# Patient Record
Sex: MALE | Race: OTHER | HISPANIC OR LATINO | Employment: FULL TIME | URBAN - METROPOLITAN AREA
[De-identification: names, ages, dates, MRNs, and addresses within clinical notes are randomized per-mention and may not be internally consistent; named-entity substitution may affect disease eponyms.]

---

## 2021-06-18 ENCOUNTER — OFFICE VISIT (OUTPATIENT)
Dept: FAMILY MEDICINE | Facility: CLINIC | Age: 26
End: 2021-06-18
Payer: COMMERCIAL

## 2021-06-18 VITALS
OXYGEN SATURATION: 98 % | HEIGHT: 67 IN | BODY MASS INDEX: 41.35 KG/M2 | TEMPERATURE: 98 F | HEART RATE: 82 BPM | DIASTOLIC BLOOD PRESSURE: 74 MMHG | SYSTOLIC BLOOD PRESSURE: 112 MMHG | WEIGHT: 263.44 LBS

## 2021-06-18 DIAGNOSIS — Z00.00 ROUTINE HEALTH MAINTENANCE: Primary | ICD-10-CM

## 2021-06-18 PROCEDURE — 90471 IMMUNIZATION ADMIN: CPT | Mod: S$GLB,,, | Performed by: INTERNAL MEDICINE

## 2021-06-18 PROCEDURE — 1126F AMNT PAIN NOTED NONE PRSNT: CPT | Mod: S$GLB,,, | Performed by: INTERNAL MEDICINE

## 2021-06-18 PROCEDURE — 90651 9VHPV VACCINE 2/3 DOSE IM: CPT | Mod: S$GLB,,, | Performed by: INTERNAL MEDICINE

## 2021-06-18 PROCEDURE — 99999 PR PBB SHADOW E&M-NEW PATIENT-LVL III: ICD-10-PCS | Mod: PBBFAC,,, | Performed by: INTERNAL MEDICINE

## 2021-06-18 PROCEDURE — 90651 HPV VACCINE 9-VALENT 3 DOSE IM: ICD-10-PCS | Mod: S$GLB,,, | Performed by: INTERNAL MEDICINE

## 2021-06-18 PROCEDURE — 99999 PR PBB SHADOW E&M-NEW PATIENT-LVL III: CPT | Mod: PBBFAC,,, | Performed by: INTERNAL MEDICINE

## 2021-06-18 PROCEDURE — 99385 PREV VISIT NEW AGE 18-39: CPT | Mod: 25,S$GLB,, | Performed by: INTERNAL MEDICINE

## 2021-06-18 PROCEDURE — 99385 PR PREVENTIVE VISIT,NEW,18-39: ICD-10-PCS | Mod: 25,S$GLB,, | Performed by: INTERNAL MEDICINE

## 2021-06-18 PROCEDURE — 90471 HPV VACCINE 9-VALENT 3 DOSE IM: ICD-10-PCS | Mod: S$GLB,,, | Performed by: INTERNAL MEDICINE

## 2021-06-18 PROCEDURE — 1126F PR PAIN SEVERITY QUANTIFIED, NO PAIN PRESENT: ICD-10-PCS | Mod: S$GLB,,, | Performed by: INTERNAL MEDICINE

## 2021-06-18 PROCEDURE — 3008F BODY MASS INDEX DOCD: CPT | Mod: CPTII,S$GLB,, | Performed by: INTERNAL MEDICINE

## 2021-06-18 PROCEDURE — 3008F PR BODY MASS INDEX (BMI) DOCUMENTED: ICD-10-PCS | Mod: CPTII,S$GLB,, | Performed by: INTERNAL MEDICINE

## 2021-06-25 ENCOUNTER — LAB VISIT (OUTPATIENT)
Dept: LAB | Facility: HOSPITAL | Age: 26
End: 2021-06-25
Attending: INTERNAL MEDICINE
Payer: COMMERCIAL

## 2021-06-25 DIAGNOSIS — Z00.00 ROUTINE HEALTH MAINTENANCE: ICD-10-CM

## 2021-06-26 ENCOUNTER — PATIENT MESSAGE (OUTPATIENT)
Dept: FAMILY MEDICINE | Facility: CLINIC | Age: 26
End: 2021-06-26

## 2021-06-26 RX ORDER — ERGOCALCIFEROL 1.25 MG/1
50000 CAPSULE ORAL
Qty: 12 CAPSULE | Refills: 6 | Status: SHIPPED | OUTPATIENT
Start: 2021-06-26 | End: 2023-01-20

## 2021-07-30 ENCOUNTER — CLINICAL SUPPORT (OUTPATIENT)
Dept: FAMILY MEDICINE | Facility: CLINIC | Age: 26
End: 2021-07-30
Payer: COMMERCIAL

## 2021-07-30 PROCEDURE — 90651 HPV VACCINE 9-VALENT 3 DOSE IM: ICD-10-PCS | Mod: S$GLB,,, | Performed by: INTERNAL MEDICINE

## 2021-07-30 PROCEDURE — 90471 IMMUNIZATION ADMIN: CPT | Mod: S$GLB,,, | Performed by: INTERNAL MEDICINE

## 2021-07-30 PROCEDURE — 90651 9VHPV VACCINE 2/3 DOSE IM: CPT | Mod: S$GLB,,, | Performed by: INTERNAL MEDICINE

## 2021-07-30 PROCEDURE — 90471 HPV VACCINE 9-VALENT 3 DOSE IM: ICD-10-PCS | Mod: S$GLB,,, | Performed by: INTERNAL MEDICINE

## 2021-10-29 ENCOUNTER — CLINICAL SUPPORT (OUTPATIENT)
Dept: FAMILY MEDICINE | Facility: CLINIC | Age: 26
End: 2021-10-29
Payer: COMMERCIAL

## 2021-10-29 DIAGNOSIS — Z00.00 ROUTINE HEALTH MAINTENANCE: Primary | ICD-10-CM

## 2021-10-29 PROCEDURE — 90651 9VHPV VACCINE 2/3 DOSE IM: CPT | Mod: S$GLB,,, | Performed by: INTERNAL MEDICINE

## 2021-10-29 PROCEDURE — 90651 HPV VACCINE 9-VALENT 3 DOSE IM: ICD-10-PCS | Mod: S$GLB,,, | Performed by: INTERNAL MEDICINE

## 2021-10-29 PROCEDURE — 90471 HPV VACCINE 9-VALENT 3 DOSE IM: ICD-10-PCS | Mod: S$GLB,,, | Performed by: INTERNAL MEDICINE

## 2021-10-29 PROCEDURE — 90471 IMMUNIZATION ADMIN: CPT | Mod: S$GLB,,, | Performed by: INTERNAL MEDICINE

## 2021-12-29 ENCOUNTER — TELEPHONE (OUTPATIENT)
Dept: FAMILY MEDICINE | Facility: CLINIC | Age: 26
End: 2021-12-29
Payer: COMMERCIAL

## 2021-12-29 DIAGNOSIS — Z20.822 ENCOUNTER FOR LABORATORY TESTING FOR COVID-19 VIRUS: ICD-10-CM

## 2022-01-03 ENCOUNTER — TELEPHONE (OUTPATIENT)
Dept: FAMILY MEDICINE | Facility: CLINIC | Age: 27
End: 2022-01-03
Payer: COMMERCIAL

## 2022-01-03 NOTE — TELEPHONE ENCOUNTER
Spoke with patient who informed me he is not having symptoms .Patient took a covid test which came back negative

## 2022-09-08 ENCOUNTER — TELEPHONE (OUTPATIENT)
Dept: FAMILY MEDICINE | Facility: CLINIC | Age: 27
End: 2022-09-08
Payer: COMMERCIAL

## 2022-09-08 DIAGNOSIS — Z00.00 ROUTINE HEALTH MAINTENANCE: Primary | ICD-10-CM

## 2022-09-08 NOTE — TELEPHONE ENCOUNTER
----- Message from Pramod Robins sent at 9/8/2022 10:21 AM CDT -----  Regarding: Order  Contact: [phone]  Patient is calling to speak with someone in office to get lab orders. Please contact pt

## 2022-09-15 ENCOUNTER — TELEPHONE (OUTPATIENT)
Dept: OBSTETRICS AND GYNECOLOGY | Facility: CLINIC | Age: 27
End: 2022-09-15
Payer: COMMERCIAL

## 2022-09-15 NOTE — TELEPHONE ENCOUNTER
----- Message from Dmitry Ramos sent at 9/15/2022  9:25 AM CDT -----  Regarding: Return Call  Who Called: PATRICIA KELSEY III [[id number]]         Who Left Message for Patient: Unsure         Does the patient know what this is regarding? Yes, labs         Best Call Back Number:[phone]         Additional Information: Patient is returning a call from last week regarding a semen analysis for his wife, LAUREMIGUEL [[id number]].  Please assist.

## 2022-10-28 ENCOUNTER — PATIENT MESSAGE (OUTPATIENT)
Dept: FAMILY MEDICINE | Facility: CLINIC | Age: 27
End: 2022-10-28
Payer: COMMERCIAL

## 2023-01-20 ENCOUNTER — OFFICE VISIT (OUTPATIENT)
Dept: FAMILY MEDICINE | Facility: CLINIC | Age: 28
End: 2023-01-20
Payer: COMMERCIAL

## 2023-01-20 VITALS
DIASTOLIC BLOOD PRESSURE: 78 MMHG | HEIGHT: 67 IN | SYSTOLIC BLOOD PRESSURE: 114 MMHG | WEIGHT: 274.94 LBS | HEART RATE: 76 BPM | OXYGEN SATURATION: 98 % | BODY MASS INDEX: 43.15 KG/M2

## 2023-01-20 DIAGNOSIS — E66.01 OBESITY, CLASS III, BMI 40-49.9 (MORBID OBESITY): ICD-10-CM

## 2023-01-20 DIAGNOSIS — Z00.00 ROUTINE HEALTH MAINTENANCE: Primary | ICD-10-CM

## 2023-01-20 DIAGNOSIS — R74.01 TRANSAMINITIS: ICD-10-CM

## 2023-01-20 PROCEDURE — 90471 FLU VACCINE (QUAD) GREATER THAN OR EQUAL TO 3YO PRESERVATIVE FREE IM: ICD-10-PCS | Mod: S$GLB,,, | Performed by: INTERNAL MEDICINE

## 2023-01-20 PROCEDURE — 90715 TDAP VACCINE GREATER THAN OR EQUAL TO 7YO IM: ICD-10-PCS | Mod: S$GLB,,, | Performed by: INTERNAL MEDICINE

## 2023-01-20 PROCEDURE — 1160F RVW MEDS BY RX/DR IN RCRD: CPT | Mod: CPTII,S$GLB,, | Performed by: INTERNAL MEDICINE

## 2023-01-20 PROCEDURE — 3008F BODY MASS INDEX DOCD: CPT | Mod: CPTII,S$GLB,, | Performed by: INTERNAL MEDICINE

## 2023-01-20 PROCEDURE — 3078F PR MOST RECENT DIASTOLIC BLOOD PRESSURE < 80 MM HG: ICD-10-PCS | Mod: CPTII,S$GLB,, | Performed by: INTERNAL MEDICINE

## 2023-01-20 PROCEDURE — 90715 TDAP VACCINE 7 YRS/> IM: CPT | Mod: S$GLB,,, | Performed by: INTERNAL MEDICINE

## 2023-01-20 PROCEDURE — 99395 PREV VISIT EST AGE 18-39: CPT | Mod: 25,S$GLB,, | Performed by: INTERNAL MEDICINE

## 2023-01-20 PROCEDURE — 90472 TDAP VACCINE GREATER THAN OR EQUAL TO 7YO IM: ICD-10-PCS | Mod: S$GLB,,, | Performed by: INTERNAL MEDICINE

## 2023-01-20 PROCEDURE — 1159F MED LIST DOCD IN RCRD: CPT | Mod: CPTII,S$GLB,, | Performed by: INTERNAL MEDICINE

## 2023-01-20 PROCEDURE — 99999 PR PBB SHADOW E&M-EST. PATIENT-LVL III: CPT | Mod: PBBFAC,,, | Performed by: INTERNAL MEDICINE

## 2023-01-20 PROCEDURE — 90686 IIV4 VACC NO PRSV 0.5 ML IM: CPT | Mod: S$GLB,,, | Performed by: INTERNAL MEDICINE

## 2023-01-20 PROCEDURE — 3074F PR MOST RECENT SYSTOLIC BLOOD PRESSURE < 130 MM HG: ICD-10-PCS | Mod: CPTII,S$GLB,, | Performed by: INTERNAL MEDICINE

## 2023-01-20 PROCEDURE — 1159F PR MEDICATION LIST DOCUMENTED IN MEDICAL RECORD: ICD-10-PCS | Mod: CPTII,S$GLB,, | Performed by: INTERNAL MEDICINE

## 2023-01-20 PROCEDURE — 3008F PR BODY MASS INDEX (BMI) DOCUMENTED: ICD-10-PCS | Mod: CPTII,S$GLB,, | Performed by: INTERNAL MEDICINE

## 2023-01-20 PROCEDURE — 99395 PR PREVENTIVE VISIT,EST,18-39: ICD-10-PCS | Mod: 25,S$GLB,, | Performed by: INTERNAL MEDICINE

## 2023-01-20 PROCEDURE — 1160F PR REVIEW ALL MEDS BY PRESCRIBER/CLIN PHARMACIST DOCUMENTED: ICD-10-PCS | Mod: CPTII,S$GLB,, | Performed by: INTERNAL MEDICINE

## 2023-01-20 PROCEDURE — 90472 IMMUNIZATION ADMIN EACH ADD: CPT | Mod: S$GLB,,, | Performed by: INTERNAL MEDICINE

## 2023-01-20 PROCEDURE — 99999 PR PBB SHADOW E&M-EST. PATIENT-LVL III: ICD-10-PCS | Mod: PBBFAC,,, | Performed by: INTERNAL MEDICINE

## 2023-01-20 PROCEDURE — 90686 FLU VACCINE (QUAD) GREATER THAN OR EQUAL TO 3YO PRESERVATIVE FREE IM: ICD-10-PCS | Mod: S$GLB,,, | Performed by: INTERNAL MEDICINE

## 2023-01-20 PROCEDURE — 3074F SYST BP LT 130 MM HG: CPT | Mod: CPTII,S$GLB,, | Performed by: INTERNAL MEDICINE

## 2023-01-20 PROCEDURE — 3078F DIAST BP <80 MM HG: CPT | Mod: CPTII,S$GLB,, | Performed by: INTERNAL MEDICINE

## 2023-01-20 PROCEDURE — 90471 IMMUNIZATION ADMIN: CPT | Mod: S$GLB,,, | Performed by: INTERNAL MEDICINE

## 2023-01-20 RX ORDER — MULTIVITAMIN
1 TABLET ORAL DAILY
COMMUNITY

## 2023-01-20 NOTE — PATIENT INSTRUCTIONS
Nutrition: How to Make Healthier Food Choices     Why is healthy eating important?  When combined with exercise, a healthy diet can help you lose weight, lower your cholesterol level and improve the way your body functions on a daily basis.  The U.S. Department of Agricultures (USDA) Food Guide Pyramid divides food into 6 basic food groups, consisting of 1) grains, 2) fruits, 3) vegetables, 4) meats and beans, 5) dairy and 6) fats.  The USDA recommends an adult daily diet include the following:  3 ounces of whole grains, and 6 ounces of grains total   2 cups of fruit   2 1/2 cups of vegetables   3 cups fat-free or low-fat dairy   The following are some ways to make healthier food choices and to get the recommended amounts of whole grains, fruits, vegetables and dairy.    Grains  Whole-grain breads are low in fat; they're also high in fiber and complex carbohydrates, which help you feel carlin longer and prevent overeating. Choose breads whose first ingredient says whole in front of the grain, for example, whole wheat flour or whole white flour; enriched or other types of flour have the important fiber and nutrients removed. Choose whole grain breads for sandwiches and as additions to meals.  Avoid rich bakery foods such as donuts, sweet rolls and muffins. These foods can contain more than 50% fat calories. Snacks such as grayson food cake and gingersnap cookies can satisfy your sweet tooth without adding fat to your diet.  Hot and cold cereals are usually low in fat. But instant cereals with cream may contain high-fat oils or butterfat. Granola cereals may also contain high-fat oils and extra sugars. Look for low-sugar options for both instant and granola cereals.  Avoid fried snacks such as potato chips and tortilla chips. Try the low-fat or baked versions instead.  Instead of this: Try this:   Croissants, biscuits, white breads and rolls Low-fat whole grain breads and rolls (wheat, rye and pumpernickel)  "  Doughnuts, pastries and scones English muffins and small whole grain bagels   Fried tortillas Soft tortillas (corn or whole wheat)   Sugar cereals and regular granola Oatmeal, low-fat granola and whole-grain cereal   Snack crackers Crackers (animal, jennifer, rye, soda, saltine, oyster)   Potato or corn chips and buttered popcorn Pretzels (unsalted) and popcorn (unbuttered)   White pasta Whole-wheat pasta   White rice Brown rice   Fried rice, or pasta and rice mixes that contain high-fat sauces Rice or pasta (without egg yolk) with vegetable sauces   All-purpose white flour 100% whole-wheat flour     Fruits and Vegetables  Fruits and vegetables are naturally low in fat. They add flavor and variety to your diet. They also contain fiber, vitamins and minerals.  Margarine, butter, mayonnaise and sour cream add fat to vegetables and fruits. Try using nonfat or low-fat versions of these foods. You can also use nonfat or low-fat yogurt or herbs as seasonings instead.  Instead of this: Try this:   Fried vegetables or vegetables served with cream, cheese or butter sauces All vegetables raw, steamed, broiled, baked or tossed with a very small amount of olive oil and salt and pepper   Coconut Fruit (fresh)   French fries, hash browns and potato   chips Baked white or sweet potatoes     Meat, Poultry and Fish  Beef, Pork, Veal and Lamb  Baking, broiling and roasting are the healthiest ways to prepare meat. Lean cuts can be pan-broiled or stir-fried. Use either a nonstick pan or nonstick spray coating instead of butter or margarine.  Trim outside fat before cooking. Trim any inside, separable fat before eating. Select low-fat, lean cuts of meat. Lean beef and veal cuts have the word "loin" or "round" in their names. Lean pork cuts have the word "loin" or "leg" in their names.  Use herbs, spices, fresh vegetables and nonfat marinades to season meat. Avoid high-fat sauces and gravies.  Poultry  Baking, broiling and roasting are " "the healthiest ways to prepare poultry. Skinless poultry can be pan-broiled or stir-fried. Use either a nonstick pan or nonstick spray coating instead of butter or margarine.  Remove skin and visible fat before cooking. Chicken breasts are a good choice because they are low in fat and high in protein. Use domestic goose and duck only once in a while because both are high in fat.  Fish  Poaching, steaming, baking and broiling are the healthiest ways to prepare fish. Fresh fish should have a clear color, a moist look, a clean smell and firm, springy flesh. If good-quality fresh fish isn't available, buy frozen fish.  Most seafood is high in healthy polyunsaturated fat. Omega-3 fatty acids are also found in some fatty fish, such as salmon and cold-water trout. They may help lower the risk of heart disease in some people.  Cross-over Foods  Dry beans, peas and lentils offer protein and fiber without the cholesterol and fat of meats. Once in a while, try substituting beans for meat in a favorite recipe, such as lasagna or chili.  TVP, or textured vegetable protein, is widely available in many foods. Vegetarian "hot dogs," "hamburger" and "chicken nuggets" are low-fat, cholesterol-free alternatives to meat.  Instead of this: Try this:   Regular or breaded fish sticks or cakes, fish canned in oil, seafood prepared with butter or served in high-fat sauce Fish (fresh, frozen, canned in water), low-fat fish sticks or cakes and shellfish (such as shrimp)   Prime and marbled cuts Select-grade lean beef (round, sirloin and loin)   Pork spare ribs and stanton Lean pork (tenderloin and loin chop) and turkey stanton   Regular ground beef Lean or extra-lean ground beef, ground chicken and turkey breast   Lunch meats such as pepperoni, salami, bologna and liverwurst Lean lunch meats such as turkey, chicken and ham   Regular hot dogs or sausage Fat-free hot dogs and turkey dogs     Dairy  Choose skim milk or low-fat buttermilk. Substitute " evaporated skim milk for cream in recipes for soups, sauces and coffee.  Try low-fat cheeses. Skim ricotta can replace cream cheese on a bagel or in a vegetable dip. Use part-skim cheeses in recipes. Use 1% cottage cheese for salads and cooking. String cheese is a low-fat, high-calcium snack option.  Plain nonfat yogurt can replace sour cream in many recipes. (To maintain texture, stir 1 tablespoon of cornstarch into each cup of yogurt that you use in cooking.) Try mixing frozen nonfat or low-fat yogurt with fruit for dessert.  Skim sherbet is an alternative to ice cream. Soft-serve and regular ice creams are also lower in fat than premium styles.  Instead of this: Try this:   Whole or 2% milk Non-fat or 1% milk   Evaporated milk Evaporated non-fat milk   Regular buttermilk Buttermilk made from non-fat (or 1%) milk   Yogurt made with whole milk Nonfat or low-fat yogurt   Regular cheese (examples: American, blue, Brie, cheddar, Alex and Parmesan) Low-fat cheese with less than 3 grams of fat per serving (example: natural cheese, processed cheese and nondairy cheese such as soy cheese)   Regular cottage cheese Low-fat, nonfat, and dry-curd cottage cheese with less than 2% fat   Regular cream cheese Low-fat cream cheese (no more than 3 grams of fat per ounce)   Regular ice cream Sorbet, sherbet and nonfat or low-fat ice cream (no more than 3 grams of fat per 1/2 cup serving)     Fats, Oils and Sweets  Eating too many high-fat foods not only adds excess calories (which can lead to obesity and weight gain), but can increase your risk factor for several diseases. Heart disease, diabetes, certain types of cancer and osteoarthritis have all been linked to diets too high in fat. If you consume too much saturated and trans fats, you are more likely to develop high cholesterol and coronary artery disease.  Sugar-sweetened drinks, such as fruit juice, fruit drinks, regular soft drinks, sports drinks, energy drinks, sweetened or  flavored milk and sweetened iced tea can add lots of sugar and calories to your diet. But staying hydrated is important for good health. Substitute water, zero-calorie flavored water, non-fat or reduced-fat milk, unsweetened tea or diet soda for sweetened drinks. Talk with your family doctor or a dietitian if you have questions about your diet or healthy eating for your family.  Instead of this: Try this:   Cookies Fig bars, gingersnaps and molasses cookies   Shortening, butter or margarine Olive, soybean and canola oils   Regular mayonnaise Nonfat or light mayonnaise   Regular salad dressing Nonfat or light salad dressing   Using fat (including butter) to grease pan Nonstick cooking spray

## 2023-01-20 NOTE — PROGRESS NOTES
Subjective:       Patient ID: Montrell Victor III is a 27 y.o. male.    Chief Complaint: Annual Exam      HPI  Montrell Victor III is a 27 y.o. male with no chronic conditions who presents today for annual evaluation.    He is here for annual exam.  Overall feels okay.  Has occasional knee pains when exercising.    Works out 3 to 4 times a week for 30 minutes doing cardio or weightlifting.    Has no toxic habits.  Not following a specific diet but will be starting a low-cholesterol diet as his wife was diagnosed with high cholesterol.      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    He is due for the flu vaccine and the Tdap.    Health Maintenance:  Health Maintenance   Topic Date Due    Hepatitis C Screening  Never done    TETANUS VACCINE  07/11/2017    Lipid Panel  Completed       Review of Systems   Constitutional: Negative.  Negative for fever and unexpected weight change.   HENT: Negative.     Eyes:  Negative for visual disturbance.   Respiratory: Negative.     Cardiovascular: Negative.    Gastrointestinal: Negative.    Genitourinary:  Negative for difficulty urinating.   Musculoskeletal:  Positive for arthralgias (Knee pain with exercise). Negative for joint swelling.   Integumentary:  Negative.   Neurological: Negative.  Negative for headaches.   Psychiatric/Behavioral: Negative.  Negative for dysphoric mood and sleep disturbance.     No past medical history on file.    No past surgical history on file.    Family History   Problem Relation Age of Onset    No Known Problems Mother     No Known Problems Father     No Known Problems Sister     No Known Problems Brother     Hyperlipidemia Maternal Grandmother     Hypertension Maternal Grandmother     Heart disease Maternal Grandmother     No Known Problems Maternal Grandfather     Diabetes Mellitus Paternal Grandfather        Social History     Socioeconomic History    Marital status:    Tobacco Use    Smoking status: Never       Current Outpatient  Medications   Medication Sig Dispense Refill    multivitamin (ONE DAILY MULTIVITAMIN) per tablet Take 1 tablet by mouth once daily.       No current facility-administered medications for this visit.       Review of patient's allergies indicates:  No Known Allergies      Objective:       Last 3 sets of Vitals    Vitals - 1 value per visit 6/18/2021 1/20/2023 1/20/2023   SYSTOLIC 112 - 114   DIASTOLIC 74 - 78   Pulse 82 - 76   Temp 97.8 - -   SPO2 98 - 98   Weight (lb) 263.45 - 274.91   Weight (kg) 119.5 - 124.7   Height 67 - 67   BMI (Calculated) 41.3 - 43   VISIT REPORT - - -   Pain Score  - 0 -   Physical Exam  Constitutional:       General: He is not in acute distress.     Appearance: He is obese.   HENT:      Head: Normocephalic.      Right Ear: Tympanic membrane, ear canal and external ear normal.      Left Ear: Tympanic membrane, ear canal and external ear normal.      Nose: Nose normal.      Mouth/Throat:      Mouth: Mucous membranes are moist.   Eyes:      General: No scleral icterus.     Extraocular Movements: Extraocular movements intact.      Conjunctiva/sclera: Conjunctivae normal.   Neck:      Vascular: No carotid bruit.      Comments: No goiter.  Cardiovascular:      Rate and Rhythm: Normal rate and regular rhythm.      Pulses: Normal pulses.      Heart sounds: Normal heart sounds.   Pulmonary:      Effort: Pulmonary effort is normal.      Breath sounds: Normal breath sounds.   Abdominal:      General: Bowel sounds are normal. There is no distension.      Palpations: Abdomen is soft. There is no mass.      Tenderness: There is no abdominal tenderness.   Musculoskeletal:         General: No swelling.   Lymphadenopathy:      Cervical: No cervical adenopathy.   Skin:     General: Skin is warm and dry.   Neurological:      General: No focal deficit present.      Mental Status: He is alert and oriented to person, place, and time.   Psychiatric:         Mood and Affect: Mood normal.         Behavior: Behavior  normal.         CBC:  Recent Labs   Lab 06/25/21  0828   WBC 6.44   RBC 4.91   Hemoglobin 14.4   Hematocrit 41.9   Platelets 229   MCV 85   MCH 29.3   MCHC 34.4     CMP:  Recent Labs   Lab 06/25/21  0828   Glucose 102   Calcium 9.1   Albumin 4.2   Total Protein 7.7   Sodium 139   Potassium 4.0   CO2 26   Chloride 103   BUN 13   Creatinine 1.1   Alkaline Phosphatase 65   ALT 87 H   AST 34   Total Bilirubin 1.8 H     URINALYSIS:       LIPIDS:  Recent Labs   Lab 06/25/21  0828   TSH 2.089   HDL 30 L   Cholesterol 109 L   Triglycerides 59   LDL Cholesterol 67.2   HDL/Cholesterol Ratio 27.5   Non-HDL Cholesterol 79   Total Cholesterol/HDL Ratio 3.6     TSH:  Recent Labs   Lab 06/25/21  0828   TSH 2.089       A1C:        Imaging:  No image results found.      Assessment:       1. Routine health maintenance    2. Transaminitis            Plan:       1. Routine health maintenance  -     CBC Auto Differential; Future; Expected date: 01/20/2023  -     Comprehensive Metabolic Panel; Future; Expected date: 01/20/2023  -     Hemoglobin A1C; Future; Expected date: 01/20/2023  -     Lipid Panel; Future; Expected date: 01/20/2023  -     TSH; Future; Expected date: 01/20/2023  -     (In Office Administered) Tdap Vaccine  - normal vital signs but increased BMI.  If weightlifting makes BMI less reliable then can follow lean body mass or ways good circumference, less than 37 in recommended.    2. Transaminitis  -     Comprehensive Metabolic Panel; Future; Expected date: 01/20/2023  -     Lipid Panel; Future; Expected date: 01/20/2023  - suspect fatty liver- healthy diet and weight loss will help.  If enzymes still elevated will order ultrasound and further workup.    Other orders  -     Influenza - Quadrivalent *Preferred* (6 months+) (PF)       Health Maintenance Due   Topic Date Due    Hepatitis C Screening  Never done    HIV Screening  Never done    TETANUS VACCINE  07/11/2017    COVID-19 Vaccine (2 - Moderna series) 06/19/2021     Influenza Vaccine (1) 09/01/2022            Return to clinic in 12 months.    Leah Merrill MD  [facility]  Disclaimer:  This note has been generated using voice-recognition software. There may be grammatical or spelling errors that have been missed during proof-reading

## 2024-02-02 ENCOUNTER — PATIENT OUTREACH (OUTPATIENT)
Dept: ADMINISTRATIVE | Facility: HOSPITAL | Age: 29
End: 2024-02-02
Payer: COMMERCIAL

## 2024-02-02 NOTE — PROGRESS NOTES
Updates were requested from care everywhere.  Health Maintenance has been updated.  [facility] immunization registry triggered.  Immunizations were reconciled.  PCP updated

## 2024-04-09 NOTE — PROGRESS NOTES
Subjective:      Chief Complaint: Establish Care    HPI  Mr.Hector Valente GAUTAM is a 28-year-old man presenting for annual physical:    Family, social, surgical Hx reviewed     Health Maintenance:  Due for annual screening labs and routine vaccinations    No past medical history on file.  No past surgical history on file.  Family History   Problem Relation Age of Onset    No Known Problems Mother     No Known Problems Father     No Known Problems Sister     No Known Problems Brother     Hyperlipidemia Maternal Grandmother     Hypertension Maternal Grandmother     Heart disease Maternal Grandmother     No Known Problems Maternal Grandfather     Diabetes Mellitus Paternal Grandfather      Social History     Socioeconomic History    Marital status:    Tobacco Use    Smoking status: Never   Substance and Sexual Activity    Drug use: Never    Sexual activity: Yes     Partners: Female     Social Determinants of Health     Financial Resource Strain: Low Risk  (4/9/2024)    Overall Financial Resource Strain (CARDIA)     Difficulty of Paying Living Expenses: Not very hard   Food Insecurity: No Food Insecurity (4/9/2024)    Hunger Vital Sign     Worried About Running Out of Food in the Last Year: Never true     Ran Out of Food in the Last Year: Never true   Transportation Needs: No Transportation Needs (4/9/2024)    PRAPARE - Transportation     Lack of Transportation (Medical): No     Lack of Transportation (Non-Medical): No   Physical Activity: Insufficiently Active (4/9/2024)    Exercise Vital Sign     Days of Exercise per Week: 3 days     Minutes of Exercise per Session: 40 min   Stress: No Stress Concern Present (4/9/2024)    [place] [employer] - Occupational Stress Questionnaire     Feeling of Stress : Only a little   Social Connections: Unknown (4/9/2024)    Social Connection and Isolation Panel [NHANES]     Frequency of Communication with Friends and Family: More than three times a week      Frequency of Social Gatherings with Friends and Family: Three times a week     Active Member of Clubs or Organizations: No     Attends Club or Organization Meetings: Patient declined     Marital Status:    Housing Stability: Low Risk  (4/9/2024)    Housing Stability Vital Sign     Unable to Pay for Housing in the Last Year: No     Number of Places Lived in the Last Year: 1     Unstable Housing in the Last Year: No     Review of patient's allergies indicates:  No Known Allergies  Montrell Victor III had no medications administered during this visit.   Review of Systems   Constitutional:  Negative for appetite change, chills and fever.   HENT: Negative.     Respiratory:  Negative for cough, chest tightness and shortness of breath.    Cardiovascular:  Negative for chest pain, palpitations and leg swelling.   Gastrointestinal:  Negative for abdominal distention, abdominal pain, blood in stool, constipation, diarrhea, nausea and vomiting.   Endocrine: Negative.    Genitourinary:  Negative for difficulty urinating, dysuria, frequency and hematuria.   Musculoskeletal: Negative.    Integumentary:  Negative.   Neurological: Negative.    Psychiatric/Behavioral: Negative.           Objective:      Vitals:    04/10/24 0754   BP: 114/74   Pulse: 66   Resp: 16   Temp: 97.7 °F (36.5 °C)      Physical Exam  Vitals reviewed.   Constitutional:       General: He is not in acute distress.     Appearance: Normal appearance.   HENT:      Head: Normocephalic and atraumatic.      Comments: Facial features are symmetric      Nose: Nose normal. No congestion or rhinorrhea.      Mouth/Throat:      Mouth: Mucous membranes are moist.      Pharynx: Oropharynx is clear. No oropharyngeal exudate or posterior oropharyngeal erythema.   Eyes:      General: No scleral icterus.     Extraocular Movements: Extraocular movements intact.      Conjunctiva/sclera: Conjunctivae normal.   Cardiovascular:      Rate and Rhythm: Normal rate and regular  rhythm.      Pulses: Normal pulses.      Heart sounds: Normal heart sounds.   Pulmonary:      Effort: Pulmonary effort is normal. No respiratory distress.      Breath sounds: Normal breath sounds.   Musculoskeletal:         General: No deformity or signs of injury. Normal range of motion.      Cervical back: Normal range of motion.      Comments: Gait normal    Skin:     General: Skin is warm and dry.      Findings: No rash.   Neurological:      General: No focal deficit present.      Mental Status: He is alert and oriented to person, place, and time. Mental status is at baseline.   Psychiatric:         Mood and Affect: Mood normal.         Behavior: Behavior normal.         Thought Content: Thought content normal.       Current Outpatient Medications on File Prior to Visit   Medication Sig Dispense Refill    cetirizine (ZYRTEC) 10 MG tablet Take 10 mg by mouth once daily.      multivitamin (ONE DAILY MULTIVITAMIN) per tablet Take 1 tablet by mouth once daily.       No current facility-administered medications on file prior to visit.         Assessment:       1. Physical exam, annual        Plan:       Physical exam, annual  -     CBC Auto Differential; Future; Expected date: 04/10/2024  -     Comprehensive Metabolic Panel; Future; Expected date: 04/10/2024  -     Hemoglobin A1C; Future; Expected date: 04/10/2024  -     Lipid Panel; Future; Expected date: 04/10/2024  -     T4; Future; Expected date: 04/10/2024  -     TSH; Future; Expected date: 04/10/2024  -     Vitamin D; Future; Expected date: 04/10/2024  -     Hepatitis C Antibody; Future; Expected date: 04/10/2024  -     HIV 1/2 Ag/Ab (4th Gen); Future; Expected date: 04/10/2024   - baseline/annual screening labs ordered.     - primary care assumed    Return to clinic in one year for annual exam or sooner if dictated by labs or illness.

## 2024-04-10 ENCOUNTER — OFFICE VISIT (OUTPATIENT)
Dept: INTERNAL MEDICINE | Facility: CLINIC | Age: 29
End: 2024-04-10
Payer: COMMERCIAL

## 2024-04-10 VITALS
WEIGHT: 285.69 LBS | HEIGHT: 71 IN | SYSTOLIC BLOOD PRESSURE: 114 MMHG | DIASTOLIC BLOOD PRESSURE: 74 MMHG | TEMPERATURE: 98 F | RESPIRATION RATE: 16 BRPM | BODY MASS INDEX: 40 KG/M2 | OXYGEN SATURATION: 97 % | HEART RATE: 66 BPM

## 2024-04-10 DIAGNOSIS — Z00.00 PHYSICAL EXAM, ANNUAL: Primary | ICD-10-CM

## 2024-04-10 PROCEDURE — 3078F DIAST BP <80 MM HG: CPT | Mod: CPTII,S$GLB,, | Performed by: STUDENT IN AN ORGANIZED HEALTH CARE EDUCATION/TRAINING PROGRAM

## 2024-04-10 PROCEDURE — 99395 PREV VISIT EST AGE 18-39: CPT | Mod: S$GLB,,, | Performed by: STUDENT IN AN ORGANIZED HEALTH CARE EDUCATION/TRAINING PROGRAM

## 2024-04-10 PROCEDURE — 3008F BODY MASS INDEX DOCD: CPT | Mod: CPTII,S$GLB,, | Performed by: STUDENT IN AN ORGANIZED HEALTH CARE EDUCATION/TRAINING PROGRAM

## 2024-04-10 PROCEDURE — 3074F SYST BP LT 130 MM HG: CPT | Mod: CPTII,S$GLB,, | Performed by: STUDENT IN AN ORGANIZED HEALTH CARE EDUCATION/TRAINING PROGRAM

## 2024-04-10 PROCEDURE — 99999 PR PBB SHADOW E&M-EST. PATIENT-LVL III: CPT | Mod: PBBFAC,,, | Performed by: STUDENT IN AN ORGANIZED HEALTH CARE EDUCATION/TRAINING PROGRAM

## 2024-04-10 PROCEDURE — 1159F MED LIST DOCD IN RCRD: CPT | Mod: CPTII,S$GLB,, | Performed by: STUDENT IN AN ORGANIZED HEALTH CARE EDUCATION/TRAINING PROGRAM

## 2024-04-10 RX ORDER — CETIRIZINE HYDROCHLORIDE 10 MG/1
10 TABLET ORAL DAILY
COMMUNITY

## 2024-04-15 ENCOUNTER — LAB VISIT (OUTPATIENT)
Dept: LAB | Facility: HOSPITAL | Age: 29
End: 2024-04-15
Attending: STUDENT IN AN ORGANIZED HEALTH CARE EDUCATION/TRAINING PROGRAM
Payer: COMMERCIAL

## 2024-04-15 DIAGNOSIS — R74.01 TRANSAMINITIS: Primary | ICD-10-CM

## 2024-04-15 DIAGNOSIS — Z00.00 PHYSICAL EXAM, ANNUAL: ICD-10-CM

## 2024-04-15 LAB
25(OH)D3+25(OH)D2 SERPL-MCNC: 26 NG/ML (ref 30–96)
ALBUMIN SERPL BCP-MCNC: 4 G/DL (ref 3.5–5.2)
ALP SERPL-CCNC: 81 U/L (ref 55–135)
ALT SERPL W/O P-5'-P-CCNC: 119 U/L (ref 10–44)
ANION GAP SERPL CALC-SCNC: 9 MMOL/L (ref 8–16)
AST SERPL-CCNC: 45 U/L (ref 10–40)
BASOPHILS # BLD AUTO: 0.05 K/UL (ref 0–0.2)
BASOPHILS NFR BLD: 0.6 % (ref 0–1.9)
BILIRUB SERPL-MCNC: 1.3 MG/DL (ref 0.1–1)
BUN SERPL-MCNC: 11 MG/DL (ref 6–20)
CALCIUM SERPL-MCNC: 9.4 MG/DL (ref 8.7–10.5)
CHLORIDE SERPL-SCNC: 104 MMOL/L (ref 95–110)
CHOLEST SERPL-MCNC: 111 MG/DL (ref 120–199)
CHOLEST/HDLC SERPL: 3.5 {RATIO} (ref 2–5)
CO2 SERPL-SCNC: 25 MMOL/L (ref 23–29)
CREAT SERPL-MCNC: 0.9 MG/DL (ref 0.5–1.4)
DIFFERENTIAL METHOD BLD: NORMAL
EOSINOPHIL # BLD AUTO: 0.2 K/UL (ref 0–0.5)
EOSINOPHIL NFR BLD: 2.8 % (ref 0–8)
ERYTHROCYTE [DISTWIDTH] IN BLOOD BY AUTOMATED COUNT: 12.8 % (ref 11.5–14.5)
EST. GFR  (NO RACE VARIABLE): >60 ML/MIN/1.73 M^2
ESTIMATED AVG GLUCOSE: 105 MG/DL (ref 68–131)
GLUCOSE SERPL-MCNC: 98 MG/DL (ref 70–110)
HBA1C MFR BLD: 5.3 % (ref 4–5.6)
HCT VFR BLD AUTO: 43.7 % (ref 40–54)
HCV AB SERPL QL IA: NORMAL
HDLC SERPL-MCNC: 32 MG/DL (ref 40–75)
HDLC SERPL: 28.8 % (ref 20–50)
HGB BLD-MCNC: 14.6 G/DL (ref 14–18)
HIV 1+2 AB+HIV1 P24 AG SERPL QL IA: NORMAL
IMM GRANULOCYTES # BLD AUTO: 0.03 K/UL (ref 0–0.04)
IMM GRANULOCYTES NFR BLD AUTO: 0.4 % (ref 0–0.5)
LDLC SERPL CALC-MCNC: 65.4 MG/DL (ref 63–159)
LYMPHOCYTES # BLD AUTO: 3 K/UL (ref 1–4.8)
LYMPHOCYTES NFR BLD: 37.2 % (ref 18–48)
MCH RBC QN AUTO: 29.4 PG (ref 27–31)
MCHC RBC AUTO-ENTMCNC: 33.4 G/DL (ref 32–36)
MCV RBC AUTO: 88 FL (ref 82–98)
MONOCYTES # BLD AUTO: 0.8 K/UL (ref 0.3–1)
MONOCYTES NFR BLD: 10 % (ref 4–15)
NEUTROPHILS # BLD AUTO: 3.9 K/UL (ref 1.8–7.7)
NEUTROPHILS NFR BLD: 49 % (ref 38–73)
NONHDLC SERPL-MCNC: 79 MG/DL
NRBC BLD-RTO: 0 /100 WBC
PLATELET # BLD AUTO: 223 K/UL (ref 150–450)
PMV BLD AUTO: 11.8 FL (ref 9.2–12.9)
POTASSIUM SERPL-SCNC: 4.4 MMOL/L (ref 3.5–5.1)
PROT SERPL-MCNC: 7.5 G/DL (ref 6–8.4)
RBC # BLD AUTO: 4.96 M/UL (ref 4.6–6.2)
SODIUM SERPL-SCNC: 138 MMOL/L (ref 136–145)
T4 SERPL-MCNC: 7.4 UG/DL (ref 4.5–11.5)
TRIGL SERPL-MCNC: 68 MG/DL (ref 30–150)
TSH SERPL DL<=0.005 MIU/L-ACNC: 1.76 UIU/ML (ref 0.4–4)
WBC # BLD AUTO: 7.99 K/UL (ref 3.9–12.7)

## 2024-04-15 PROCEDURE — 84436 ASSAY OF TOTAL THYROXINE: CPT | Performed by: STUDENT IN AN ORGANIZED HEALTH CARE EDUCATION/TRAINING PROGRAM

## 2024-04-15 PROCEDURE — 87389 HIV-1 AG W/HIV-1&-2 AB AG IA: CPT | Performed by: STUDENT IN AN ORGANIZED HEALTH CARE EDUCATION/TRAINING PROGRAM

## 2024-04-15 PROCEDURE — 83036 HEMOGLOBIN GLYCOSYLATED A1C: CPT | Performed by: STUDENT IN AN ORGANIZED HEALTH CARE EDUCATION/TRAINING PROGRAM

## 2024-04-15 PROCEDURE — 86803 HEPATITIS C AB TEST: CPT | Performed by: STUDENT IN AN ORGANIZED HEALTH CARE EDUCATION/TRAINING PROGRAM

## 2024-04-15 PROCEDURE — 85025 COMPLETE CBC W/AUTO DIFF WBC: CPT | Performed by: STUDENT IN AN ORGANIZED HEALTH CARE EDUCATION/TRAINING PROGRAM

## 2024-04-15 PROCEDURE — 82306 VITAMIN D 25 HYDROXY: CPT | Performed by: STUDENT IN AN ORGANIZED HEALTH CARE EDUCATION/TRAINING PROGRAM

## 2024-04-15 PROCEDURE — 80053 COMPREHEN METABOLIC PANEL: CPT | Performed by: STUDENT IN AN ORGANIZED HEALTH CARE EDUCATION/TRAINING PROGRAM

## 2024-04-15 PROCEDURE — 36415 COLL VENOUS BLD VENIPUNCTURE: CPT | Mod: PO | Performed by: STUDENT IN AN ORGANIZED HEALTH CARE EDUCATION/TRAINING PROGRAM

## 2024-04-15 PROCEDURE — 80061 LIPID PANEL: CPT | Performed by: STUDENT IN AN ORGANIZED HEALTH CARE EDUCATION/TRAINING PROGRAM

## 2024-04-15 PROCEDURE — 84443 ASSAY THYROID STIM HORMONE: CPT | Performed by: STUDENT IN AN ORGANIZED HEALTH CARE EDUCATION/TRAINING PROGRAM

## 2024-04-16 ENCOUNTER — TELEPHONE (OUTPATIENT)
Dept: INTERNAL MEDICINE | Facility: CLINIC | Age: 29
End: 2024-04-16
Payer: COMMERCIAL

## 2024-04-16 NOTE — TELEPHONE ENCOUNTER
I spoke to pt and notified him of the following results and comments per Dr. ePña:  - 1 time/life time hepatitis/HIV panel is normal  - thyroid and kidney function are normal  - cholesterol is normal  - cell lines are reassuring  - diabetic screening is normal  -  You have a modest Vitamin D deficiency for which I would recommend an over the counter daily supplement (somewhere between 1,000-and-5,000 units) if not already doing so.  - you have a moderate elevation in liver enzymes.  I would like to repeat this in approximately one-month along with strict avoidance of both Tylenol/acetaminophen containing products and alcohol.    Pt can view comments on the portal. I offered to scheduled f/u lab appt.  Pt will schedule through the portal

## 2024-04-16 NOTE — TELEPHONE ENCOUNTER
----- Message from Didier Peña MD sent at 4/15/2024  5:22 PM CDT -----  Please see one-month repeat CMP

## 2025-01-21 ENCOUNTER — PATIENT MESSAGE (OUTPATIENT)
Dept: OPHTHALMOLOGY | Facility: CLINIC | Age: 30
End: 2025-01-21
Payer: COMMERCIAL

## 2025-01-21 ENCOUNTER — TELEPHONE (OUTPATIENT)
Dept: OPTOMETRY | Facility: CLINIC | Age: 30
End: 2025-01-21
Payer: COMMERCIAL

## 2025-01-21 NOTE — TELEPHONE ENCOUNTER
Left message that our clinics are closed on 1/22/25 and we need to cancel your appointment.  Please call us back at [phone] to reschedule your appointment.

## 2025-01-27 ENCOUNTER — TELEPHONE (OUTPATIENT)
Dept: OPTOMETRY | Facility: CLINIC | Age: 30
End: 2025-01-27
Payer: COMMERCIAL